# Patient Record
(demographics unavailable — no encounter records)

---

## 2025-03-13 NOTE — END OF VISIT
[FreeTextEntry3] : I, Aldair Alvarez, acted as a scribe on behalf of Dr. Nadiya Miranda D.O. on 03/13/2025.   All medical entries made by the scribe were at my, Dr. Nadiya Miranda D.O., direction and personally dictated by me on 03/13/2025. I have reviewed the chart and agree that the record accurately reflects my personal performance of the history, physical exam, assessment and plan. I have also personally directed, reviewed, and agreed with the chart.

## 2025-03-13 NOTE — HISTORY OF PRESENT ILLNESS
[FreeTextEntry1] : 43 yr old presents for an annual. Pt is doing well w/o complaints.  OBHx:  x1,  x1 (twins) GYNHx: INFERTILITY top X1 Sexually Active   Prevenitive Vist: -Mammo: 2024

## 2025-03-13 NOTE — PLAN
[FreeTextEntry1] : 43 yr old presents for an annual.   PLAN -Pap done -Rx mammo/ sono -RTO in 1 yr